# Patient Record
Sex: FEMALE | Race: OTHER
[De-identification: names, ages, dates, MRNs, and addresses within clinical notes are randomized per-mention and may not be internally consistent; named-entity substitution may affect disease eponyms.]

---

## 2017-11-20 ENCOUNTER — HOSPITAL ENCOUNTER (INPATIENT)
Dept: HOSPITAL 74 - JER | Age: 42
LOS: 2 days | Discharge: HOME | DRG: 770 | End: 2017-11-22
Attending: OBSTETRICS & GYNECOLOGY | Admitting: OBSTETRICS & GYNECOLOGY
Payer: COMMERCIAL

## 2017-11-20 VITALS — BODY MASS INDEX: 20.1 KG/M2

## 2017-11-20 DIAGNOSIS — Z3A.09: ICD-10-CM

## 2017-11-20 DIAGNOSIS — A41.9: ICD-10-CM

## 2017-11-20 DIAGNOSIS — O03.37: Primary | ICD-10-CM

## 2017-11-20 LAB
ALBUMIN SERPL-MCNC: 3.9 G/DL (ref 3.4–5)
ALP SERPL-CCNC: 47 U/L (ref 45–117)
ALT SERPL-CCNC: 20 U/L (ref 12–78)
ANION GAP SERPL CALC-SCNC: 10 MMOL/L (ref 8–16)
APTT BLD: 29.6 SECONDS (ref 26.9–34.4)
AST SERPL-CCNC: 17 U/L (ref 15–37)
BILIRUB SERPL-MCNC: 0.7 MG/DL (ref 0.2–1)
CALCIUM SERPL-MCNC: 8.7 MG/DL (ref 8.5–10.1)
CO2 SERPL-SCNC: 25 MMOL/L (ref 21–32)
CREAT SERPL-MCNC: 0.8 MG/DL (ref 0.55–1.02)
DEPRECATED RDW RBC AUTO: 13.9 % (ref 11.6–15.6)
GLUCOSE SERPL-MCNC: 106 MG/DL (ref 74–106)
INR BLD: 1.09 (ref 0.82–1.09)
MCH RBC QN AUTO: 29.2 PG (ref 25.7–33.7)
MCHC RBC AUTO-ENTMCNC: 32 G/DL (ref 32–36)
MCV RBC: 91.1 FL (ref 80–96)
METAMYELOCYTES NFR BLD: 2 % (ref 0–2)
NEUTS BAND NFR BLD: 8 %
PLATELET # BLD AUTO: 238 K/MM3 (ref 134–434)
PLATELET BLD QL SMEAR: ADEQUATE
PMV BLD: 7.9 FL (ref 7.5–11.1)
PROT SERPL-MCNC: 7.1 G/DL (ref 6.4–8.2)
PT PNL PPP: 12.3 SEC (ref 9.98–11.88)
TOTAL CELLS COUNTED BLD: 100
TOXIC GRANULES BLD QL SMEAR: (no result)
WBC # BLD AUTO: 17.8 K/MM3 (ref 4–10)

## 2017-11-20 PROCEDURE — 10D17ZZ EXTRACTION OF PRODUCTS OF CONCEPTION, RETAINED, VIA NATURAL OR ARTIFICIAL OPENING: ICD-10-PCS | Performed by: OBSTETRICS & GYNECOLOGY

## 2017-11-20 NOTE — PDOC
Rapid Medical Evaluation


Chief Complaint: Vaginal Bleeding


Time Seen by Provider: 11/20/17 13:56


Medical Evaluation: 


 Allergies











Allergy/AdvReac Type Severity Reaction Status Date / Time


 


No Known Allergies Allergy   Verified 11/20/17 13:45








 Vital Signs











Temp Pulse Resp BP Pulse Ox


 


 98.5 F   104 H  18   117/71   100 


 


 11/20/17 13:45  11/20/17 13:45  11/20/17 13:45  11/20/17 13:45  11/20/17 13:45











11/20/17 14:07


I have performed a brief in-person evaluation of this patient.





The patient presents with a chief complaint of: diarrhea, blood in stool





Pertinent physical exam findings: stable





I have ordered the following: cbc, cmp, coags, type and screen





The patient will proceed to the ED for further evaluation.


11/20/17 14:35

## 2017-11-20 NOTE — OP
Operative Note





- Note:


Operative Date: 17


Pre-Operative Diagnosis: 41yo with Incomplete, infected 


Operation: Dialation and curettage


Findings: 





Retained POC


Post-Operative Diagnosis: Same as Pre-op


Surgeon: Elana Campos


Anesthesiologist/CRNA: Garret Way


Anesthesia: MAC


Estimated Blood Loss (mls): 0


Drains, Volume Out (mls): 50


Fluid Volume Replaced (mls): 500


Operative Report Dictated: Yes

## 2017-11-20 NOTE — PDOC
History of Present Illness





- General


Chief Complaint: Vaginal Bleeding


Stated Complaint: O.R PCP SENT FOR ADMIN d&c


Time Seen by Provider: 11/20/17 13:56





- History of Present Illness


Initial Comments: 





11/20/17 15:47


Ms. Eagle is a 43 yo female w/ pmh of prior left leg blood clot during 

pregnancy and eczema who presents from provider. She says that she had a 

miscarriage on October 25th and started bleeding Nov. 1st. She has been 

bleeding more or less since. She presented to her provider today for evaluation 

and was sent here with D&C scheduled for 7pm. 





The patient denies chest pain, shortness of breath, headache and dizziness. 

Denies fever, chills, nausea, vomit, diarrhea and constipation. Denies dysuria, 

frequency, urgency and hematuria.





Allergies: NKDA





Past History





- Past Medical History


Allergies/Adverse Reactions: 


 Allergies











Allergy/AdvReac Type Severity Reaction Status Date / Time


 


No Known Allergies Allergy   Verified 11/20/17 13:45











COPD: No





- Suicide/Smoking/Psychosocial Hx


Smoking History: Never smoked


Information on smoking cessation initiated: No


Hx Alcohol Use: No


Drug/Substance Use Hx: No


Substance Use Type: None





**Review of Systems





- Review of Systems


Comments:: 





11/20/17 15:51


GENERAL/CONSTITUTIONAL: +Generalized weakness


HEAD, EYES, EARS, NOSE AND THROAT: No change in vision. No ear pain or 

discharge. No sore throat.


CARDIOVASCULAR: No chest pain or shortness of breath


RESPIRATORY: No cough, wheezing, or hemoptysis.


GASTROINTESTINAL: No nausea, vomiting, diarrhea or constipation.


GENITOURINARY: No dysuria, frequency, or change in urination.


MUSCULOSKELETAL: No joint or muscle swelling or pain. No neck or back pain.


SKIN: No rash


NEUROLOGIC: No headache, vertigo, loss of consciousness, or change in strength/

sensation.


ENDOCRINE: No increased thirst. No abnormal weight change


HEMATOLOGIC/LYMPHATIC: No anemia, easy bleeding, or history of blood clots.


ALLERGIC/IMMUNOLOGIC: No hives or skin allergy.


: Vaginal bleeding reported 





*Physical Exam





- Vital Signs


 Last Vital Signs











Temp Pulse Resp BP Pulse Ox


 


 98.5 F   104 H  18   117/71   100 


 


 11/20/17 13:45  11/20/17 13:45  11/20/17 13:45  11/20/17 13:45  11/20/17 13:45














- Physical Exam


Comments: 





11/20/17 15:52


GENERAL: Awake, alert, and fully oriented, in no acute distress


HEAD: No signs of trauma, normocephalic, atraumatic


EYES: PERRLA, EOMI, sclera anicteric, conjunctiva clear


ENT: Auricles normal inspection, hearing grossly normal, nares patent, 

oropharynx clear without


exudates. Moist mucosa


NECK: Normal ROM, supple, no lymphadenopathy, JVD, or masses


LUNGS: No distress, speaks full sentences, clear to auscultation bilaterally


HEART: Regular rate and rhythm, normal S1 and S2, no murmurs, rubs or gallops, 

peripheral pulses normal and equal bilaterally.


ABDOMEN: Soft, nontender, normoactive bowel sounds.  No guarding, no rebound.  

No masses


EXTREMITIES: Normal inspection, Normal range of motion, no edema.  No clubbing 

or cyanosis.


NEUROLOGICAL: Cranial nerves II through XII grossly intact.  Normal speech, 

normal gait, no focal sensorimotor deficits


SKIN: Warm, Dry, normal turgor, no rashes or lesions noted.





ED Treatment Course





- LABORATORY


CBC & Chemistry Diagram: 


 11/20/17 14:04





 11/20/17 14:04





- ADDITIONAL ORDERS


Additional order review: 


 Laboratory  Results











  11/20/17 11/20/17 11/20/17





  14:04 14:04 14:04


 


PT with INR    12.30 H


 


INR    1.09


 


PTT (Actin FS)    29.6


 


Sodium  140  


 


Potassium  3.8  


 


Chloride  105  


 


Carbon Dioxide  25  


 


Anion Gap  10  


 


BUN  10  


 


Creatinine  0.8  


 


Creat Clearance w eGFR  > 60  


 


Random Glucose  106  


 


Calcium  8.7  


 


Total Bilirubin  0.7  


 


AST  17  


 


ALT  20  


 


Alkaline Phosphatase  47  


 


Total Protein  7.1  


 


Albumin  3.9  


 


Serum Pregnancy, Qual   Positive 








 











  11/20/17





  14:04


 


RBC  4.05


 


MCV  91.1


 


MCHC  32.0


 


RDW  13.9


 


MPV  7.9


 


Neutrophils %  No Result Required.


 


Lymphocytes %  No Result Required.














Medical Decision Making





- Medical Decision Making





11/20/17 15:52


Patient discussed with admitting  who will perform D&C tonight. Vaginal exam 

not done as not diagnostically necessary. Patient will be admitted for planned 

surgery and IV ABX started.





*DC/Admit/Observation/Transfer


Diagnosis at time of Disposition: 


 Vaginal bleeding








- Discharge Dispostion


Admit: Yes





- Referrals





- Patient Instructions





- Post Discharge Activity

## 2017-11-20 NOTE — HP
Admitting History and Physical





- Primary Care Physician


PCP: Elana Campos





- Admission


Chief Complaint: 41yo P2 with abdominal pain, foul smeling discharge, leveling 

HCG, post spontaneous Ab


History of Present Illness: 





seen 10/25/17 9.5wks by dates, HCG 19,531, empty sack on US


Since all signs of SAB, dropping HCG count 13,995(10/28), 8001 (11/2) she was 

observed


She complained on 11/11 of foul odor and was given and took course of PO Flagyl 

without resolution of symptoms, HCG 84 (11/18)


presented to the office today 11/20 with worsening of pain and foul smelling 

discharge


History Source: Patient


Limitations to Obtaining History: No Limitations





- Past Medical History


Cardiovascular: Yes: Deep Vein Thrombosis (2009 during first pregnancy, states 

had negative w/up)


Reproductive: Yes: Other (Ovarian cyst)


...LMP: 08/16/17


...Pregnant: Yes (s/p Incomplete Ab)





- Past Surgical History


Past Surgical History: Yes: Breast Biopsy


Additional Past Surgical History: 





nevus removal





- Smoking History


Smoking history: Never smoked


Have you smoked in the past 12 months: No





- Alcohol/Substance Use


Hx Alcohol Use: No





Home Medications





- Allergies


Allergies/Adverse Reactions: 


 Allergies











Allergy/AdvReac Type Severity Reaction Status Date / Time


 


No Known Allergies Allergy   Verified 11/20/17 13:45














- Home Medications


Home Medications: 


Ambulatory Orders





NK [No Known Home Medication]  11/20/17 











Family Disease History





- Family Disease History


Family Disease History: Other: Mother (DVT, PE)





Review of Systems





- Review of Systems


Constitutional: reports: No Symptoms


Eyes: reports: No Symptoms


HENT: reports: No Symptoms


Neck: reports: No Symptoms


Cardiovascular: reports: No Symptoms


Respiratory: reports: No Symptoms


Gastrointestinal: reports: Abdominal Pain


Genitourinary: reports: Vaginal Bleeding, Other (foul copious vaginal discharge

, fundal tenderness)





Physical Examination


Vital Signs: 


 Vital Signs











Temperature  98.2 F   11/20/17 21:58


 


Pulse Rate  66   11/20/17 21:58


 


Respiratory Rate  17   11/20/17 21:58


 


Blood Pressure  118/82   11/20/17 21:58


 


O2 Sat by Pulse Oximetry (%)  100   11/20/17 21:58











Constitutional: Yes: Well Nourished


HENT: Yes: WNL


Neck: Yes: WNL


Cardiovascular: Yes: WNL


Respiratory: Yes: WNL


Gastrointestinal: Yes: WNL, Tenderness, Other (low abdominal tenderness)


Renal/: Yes: Vaginal Discharge (purulent, foul odor discharge), Other (CMT, 

Fundal tenderness)


Musculoskeletal: Yes: WNL


Extremities: Yes: WNL


Integumentary: Yes: WNL


Neurological: Yes: WNL


...Motor Strength: WNL


Psychiatric: Yes: WNL


Labs: 


 CBC, BMP





 11/20/17 14:04 





 11/20/17 14:04 











Imaging





- Results


Ultrasound: Image Reviewed (1.2cm endometrial stripe, irregula c/w retained 

products)





Assessment/Plan





43 yo P2 with Incomplete Septic Ab and Endometritis


Admit for D&C


NPO, IVF, Start triple antibiotics: Ceftriaxone, Flagyl, Doxycycline


Consent signed, patient understands risks of infection bleeding, uterine 

perforation and scaring

## 2017-11-20 NOTE — PDOC
Attending Attestation





- Resident


Resident Name: Marcel Joyner





- ED Attending Attestation


I have performed the following: I have examined & evaluated the patient, The 

case was reviewed & discussed with the resident, I agree w/resident's findings 

& plan, Exceptions are as noted





- Medical Decision Making





11/20/17 16:35








I, Dr. Mary Ruelas, DO, attest that this document has been prepared under 

my direction and personally reviewed by me in its entirety.   I further attest, 

that it accurately reflects all work, treatment, procedures and medical decision

-making performed by me.  





a/p: 41yo female sent by OB for preop labs for d/c tonight


-preop labs, hcg, ivf hydration, zofran, pepcid, NPO.


will be going to the OR with gyn at 7pm





<Mary Ruelas - Last Filed: 11/20/17 16:34>





- HPI


HPI: 





11/20/17 16:39


Patient is a 42 year old female with PMHx of left leg blood clot during 

pregnancy who presents to the ER for vaginal bleeding. She was sent to the ER 

and has a D&C scheduled for 7pm tonight. She has been NPO since last night.  

She complains of nausea and vomiting since 11am this morning. She is having 

lower abdominal pelvic cramps. 





- Physicial Exam


PE: 





11/20/17 16:38





GENERAL: Awake, alert, and fully oriented, in no acute distress


HEAD: No signs of trauma


EYES: PERRLA, EOMI, sclera anicteric, conjunctiva clear


ENT: Auricles normal inspection, hearing grossly normal, nares patent, 

oropharynx clear without exudates. Moist mucosa


NECK: Normal ROM, supple, no lymphadenopathy, JVD, or masses


LUNGS: Breath sounds equal, clear to auscultation bilaterally.  No wheezes, and 

no crackles


HEART: Regular rate and rhythm, normal S1 and S2, no murmurs, rubs or gallops


ABDOMEN: Mild tenderness in left pelvic area. Residual heartburn from vomiting 

today. Soft,, normoactive bowel sounds.  No guarding, no rebound.  No masses


EXTREMITIES: No lower extremity edema. Normal range of motion, no edema.  No 

clubbing or cyanosis. No cords, erythema, or tenderness


NEUROLOGICAL: Cranial nerves II through XII grossly intact.  Normal speech, 

normal gait


SKIN: Warm, Dry, normal turgor, no rashes or lesions noted.








<Maria T Mendez - Last Filed: 11/20/17 16:40>

## 2017-11-21 LAB
DEPRECATED RDW RBC AUTO: 14 % (ref 11.6–15.6)
MCH RBC QN AUTO: 29.3 PG (ref 25.7–33.7)
MCHC RBC AUTO-ENTMCNC: 32.4 G/DL (ref 32–36)
MCV RBC: 90.6 FL (ref 80–96)
PLATELET # BLD AUTO: 228 K/MM3 (ref 134–434)
PMV BLD: 8.3 FL (ref 7.5–11.1)
WBC # BLD AUTO: 14.5 K/MM3 (ref 4–10)

## 2017-11-21 RX ADMIN — METRONIDAZOLE SCH MLS/HR: 500 INJECTION, SOLUTION INTRAVENOUS at 14:49

## 2017-11-21 RX ADMIN — CEFTRIAXONE SCH MLS/HR: 1 INJECTION, SOLUTION INTRAVENOUS at 09:17

## 2017-11-21 RX ADMIN — METRONIDAZOLE SCH MLS/HR: 500 INJECTION, SOLUTION INTRAVENOUS at 08:11

## 2017-11-21 RX ADMIN — METRONIDAZOLE SCH MLS/HR: 500 INJECTION, SOLUTION INTRAVENOUS at 04:15

## 2017-11-21 RX ADMIN — ENOXAPARIN SODIUM SCH MG: 30 INJECTION SUBCUTANEOUS at 00:17

## 2017-11-21 RX ADMIN — CEFTRIAXONE SCH MLS/HR: 1 INJECTION, SOLUTION INTRAVENOUS at 01:14

## 2017-11-21 RX ADMIN — METRONIDAZOLE SCH MLS/HR: 500 INJECTION, SOLUTION INTRAVENOUS at 20:51

## 2017-11-21 RX ADMIN — METRONIDAZOLE SCH MLS/HR: 500 INJECTION, SOLUTION INTRAVENOUS at 00:09

## 2017-11-21 RX ADMIN — DOXYCYCLINE SCH MLS/HR: 100 INJECTION, POWDER, LYOPHILIZED, FOR SOLUTION INTRAVENOUS at 22:06

## 2017-11-21 RX ADMIN — DOXYCYCLINE SCH MLS/HR: 100 INJECTION, POWDER, LYOPHILIZED, FOR SOLUTION INTRAVENOUS at 02:08

## 2017-11-21 RX ADMIN — DOXYCYCLINE SCH MLS/HR: 100 INJECTION, POWDER, LYOPHILIZED, FOR SOLUTION INTRAVENOUS at 10:04

## 2017-11-21 RX ADMIN — ENOXAPARIN SODIUM SCH MG: 30 INJECTION SUBCUTANEOUS at 09:17

## 2017-11-21 RX ADMIN — ENOXAPARIN SODIUM SCH MG: 30 INJECTION SUBCUTANEOUS at 22:08

## 2017-11-21 NOTE — PN
Progress Note, Physician


Chief Complaint: 





Feels better, no nausea, no vomiting, pain controlled


History of Present Illness: 





s/p D&C 11/21/17





- Current Medication List


Current Medications: 


Active Medications





Enoxaparin Sodium (Lovenox -)  30 mg SQ BID [facility]


   Last Admin: 11/21/17 00:17 Dose:  30 mg


Lactated Ringer's (Lactated Ringers Solution)  1,000 mls @ 75 mls/hr IV ASDIR 

FLORI


Parenteral Electrolytes (Plasma-Lyte 148 -)  1,000 mls @ 125 mls/hr IV ASDIR [facility]


   Last Admin: 11/20/17 23:15 Dose:  0 mls


Metronidazole (Flagyl 500mg Premixed Ivpb -)  500 mg in 100 mls @ 100 mls/hr 

IVPB Q6H-IV [facility]


   Stop: 11/21/17 23:14


   Last Admin: 11/21/17 08:11 Dose:  100 mls/hr


Doxycycline Hyclate 100 mg/ (Dextrose)  100 mls @ 50 mls/hr IVPB BID [facility]


   Stop: 11/21/17 23:14


   Last Admin: 11/21/17 02:08 Dose:  50 mls/hr


CEFTRIAXONE 1 G/50 ML PREMIX (Ceftriaxone 1 Gm-D5w Bag)  50 mls @ 100 mls/hr 

IVPB DAILY [facility]


   Stop: 11/21/17 23:14


   Last Admin: 11/21/17 01:14 Dose:  100 mls/hr


Ibuprofen (Motrin -)  600 mg PO Q6H PRN


   PRN Reason: FEVER


Ibuprofen (Caldolor Injection -)  800 mg IVPB Q6H PRN


   PRN Reason: FEVER


Ondansetron HCl (Zofran Injection)  4 mg IVPUSH Q6H PRN


   PRN Reason: NAUSEA AND/OR VOMITING


Ondansetron HCl (Zofran Injection)  4 mg IVPUSH Q6H PRN


   PRN Reason: NAUSEA


Oxycodone HCl (Roxicodone -)  10 mg PO Q4H PRN


   PRN Reason: SEVERE PAIN


   Stop: 11/21/17 22:54


Promethazine HCl (Phenergan Injection -)  12.5 mg IVPUSH Q6H PRN


   PRN Reason: NAUSEA-FOR RESCUE AFTER 15 MIN











- Objective


Vital Signs: 


 Vital Signs











Temperature  98 F   11/21/17 08:05


 


Pulse Rate  58 L  11/21/17 08:05


 


Respiratory Rate  20   11/21/17 08:05


 


Blood Pressure  94/40   11/21/17 08:05


 


O2 Sat by Pulse Oximetry (%)  98   11/20/17 23:05











Constitutional: Yes: Well Nourished


Neck: Yes: WNL


Cardiovascular: Yes: WNL


Respiratory: Yes: WNL


Gastrointestinal: Yes: WNL, Soft, Other (mild LLQ tenderness)


Genitourinary: Yes: Vaginal Bleeding (minimal)


Extremities: Yes: WNL (Venodyne stockings)


Neurological: Yes: WNL


...Motor Strength: WNL


Psychiatric: Yes: WNL


Labs: 


 CBC, BMP





 11/20/17 14:04 





 INR, PTT











INR  1.09  (0.82-1.09)   11/20/17  14:04    














Assessment/Plan





41 yo P2 s/p D&C for Septic AB doing well 


WBC improving


still some mild abdominal symptoms


will observe for 24 more hr of IV Abx


than will d/c home on PO

## 2017-11-22 VITALS — HEART RATE: 55 BPM | TEMPERATURE: 98.2 F | DIASTOLIC BLOOD PRESSURE: 62 MMHG | SYSTOLIC BLOOD PRESSURE: 99 MMHG

## 2017-11-22 LAB
BASOPHILS # BLD: 0.4 % (ref 0–2)
DEPRECATED RDW RBC AUTO: 14.1 % (ref 11.6–15.6)
EOSINOPHIL # BLD: 1.1 % (ref 0–4.5)
MCH RBC QN AUTO: 29.7 PG (ref 25.7–33.7)
MCHC RBC AUTO-ENTMCNC: 32.1 G/DL (ref 32–36)
MCV RBC: 92.3 FL (ref 80–96)
NEUTROPHILS # BLD: 57.7 % (ref 42.8–82.8)
PLATELET # BLD AUTO: 197 K/MM3 (ref 134–434)
PMV BLD: 8.2 FL (ref 7.5–11.1)
WBC # BLD AUTO: 8.3 K/MM3 (ref 4–10)

## 2017-11-22 RX ADMIN — METRONIDAZOLE SCH MLS/HR: 500 INJECTION, SOLUTION INTRAVENOUS at 02:46

## 2017-11-22 RX ADMIN — METRONIDAZOLE SCH MLS/HR: 500 INJECTION, SOLUTION INTRAVENOUS at 08:52

## 2017-11-22 RX ADMIN — ENOXAPARIN SODIUM SCH MG: 30 INJECTION SUBCUTANEOUS at 09:51

## 2017-11-22 RX ADMIN — METRONIDAZOLE SCH MLS/HR: 500 INJECTION, SOLUTION INTRAVENOUS at 15:03

## 2017-11-22 NOTE — DS
Physical Examination


Vital Signs: 


 Vital Signs











Temperature  98.2 F   11/22/17 09:16


 


Pulse Rate  55 L  11/22/17 09:16


 


Respiratory Rate  20   11/22/17 09:16


 


Blood Pressure  99/62   11/22/17 09:16


 


O2 Sat by Pulse Oximetry (%)  98   11/20/17 23:05











Labs: 


 CBC, BMP





 11/22/17 07:45 





 11/20/17 14:04 











Discharge Summary


Reason For Visit: VAGINAL BLEEDING


Current Active Problems





Vaginal bleeding (Acute)











- Instructions





- Home Medications


Comprehensive Discharge Medication List: 


Ambulatory Orders





NK [No Known Home Medication]  11/20/17

## 2017-11-22 NOTE — PATH
Surgical Pathology Report



Patient Name:  JAMILA PRADO

Accession #:  [id number]

[facility]. Rec. #:  [id number]                                                        

   /Age/Gender:  1975 (Age: 42) / F

Account:  [id number]                                                          

             Location: [facility] OBS/GYN

Taken:  2017

Received:  2017

Reported:  2017

Physicians:  Elana Campos M.D.

  



Specimen(s) Received

 PRODUCTS OF CONCEPTION AND CURETTAGE 





Clinical History

Preoperative diagnosis: Incomplete 

Postoperative diagnosis: Same







Final Diagnosis

UTERINE CONTENTS, CURETTINGS:

DEGENERATED CHORIONIC VILLI CONSISTENT WITH PRODUCTS OF CONCEPTION, ALONG WITH

DECIDUA AND WEAKLY PROLIFERATIVE ENDOMETRIUM.





***Electronically Signed***

Ted Pedroza M.D.





Gross Description

Received in formalin labeled "products of conception and curettage," is a 5.5 x

4.0 x 0.4 cm aggregate of tan brown soft tissue fragments. No definite villous

tissue or fetal somatic tissue is identified. Representative sections are

submitted in 3 cassettes.

/2017



[facility]

## 2017-11-22 NOTE — OP
DATE OF OPERATION:  2017

 

PREOPERATIVE DIAGNOSIS:  A 22-year-old with incomplete effective .

 

OPERATION:  Dilation and curettage.

 

FINDINGS:  Retained product of conception. 

 

POSTOPERATIVE DIAGNOSIS:   Retained product of conception.

 

SURGEON:  Elana Campos MD

 

ANESTHESIOLOGIST:  Garret Way MD

 

ANESTHESIA:  MAC.

 

ESTIMATED BLOOD LOSS:  Zero mL. 

 

URINE OUTPUT:  50 mL.  Patient received 500 mL of IV fluids. 

 

DESCRIPTION OF PROCEDURE:  After assuring informed consent, the patient was brought

to the operating room where she was placed in the dorsal lithotomy position. 

Perineum and vagina were prepped with betadine and draped in the sterile fashion.  A

blunt-sided speculum was placed in the vagina and the vagina was previously examined

and the uterus was found to be 7 cm in size with closed cervix.  The cervix was

articulated with a single-toothed tenaculum and gradually dilated per dilators to

accommodate a 7-gauge curette.  Suction curettage was performed and products of

conception were suctioned and visualized.  Aspirated gentle curettage with number 2

curette was performed in all 4 quadrants.  Uterus was found to be empty.  Suction was

performed one more time.  All instruments were removed from the cervix and vagina. 

Sponge and instrument count was correct x2.  Patient was brought to the recovery room

in stable condition.

 

 

CONOR [id number]

DD: 2017 20:39

DT: 2017 00:31

Job #:  [id number]

## 2022-03-18 ENCOUNTER — HOSPITAL ENCOUNTER (EMERGENCY)
Dept: HOSPITAL 74 - JER | Age: 47
Discharge: HOME | End: 2022-03-18
Payer: COMMERCIAL

## 2022-03-18 VITALS — BODY MASS INDEX: 20.1 KG/M2

## 2022-03-18 VITALS — HEART RATE: 70 BPM | SYSTOLIC BLOOD PRESSURE: 151 MMHG | TEMPERATURE: 97.8 F | DIASTOLIC BLOOD PRESSURE: 84 MMHG
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
## 2022-03-18 DIAGNOSIS — S03.00XA: Primary | ICD-10-CM

## 2022-12-27 ENCOUNTER — HOSPITAL ENCOUNTER (OUTPATIENT)
Dept: HOSPITAL 74 - JASU-SURG | Age: 47
Discharge: HOME | End: 2022-12-27
Attending: OBSTETRICS & GYNECOLOGY
Payer: COMMERCIAL

## 2022-12-27 VITALS — RESPIRATION RATE: 16 BRPM

## 2022-12-27 VITALS — BODY MASS INDEX: 20.9 KG/M2

## 2022-12-27 VITALS — HEART RATE: 55 BPM | SYSTOLIC BLOOD PRESSURE: 117 MMHG | DIASTOLIC BLOOD PRESSURE: 57 MMHG

## 2022-12-27 VITALS — TEMPERATURE: 97.3 F

## 2022-12-27 DIAGNOSIS — D25.9: ICD-10-CM

## 2022-12-27 DIAGNOSIS — N92.0: Primary | ICD-10-CM

## 2022-12-27 DIAGNOSIS — N84.0: ICD-10-CM

## 2022-12-27 PROCEDURE — 0UDB7ZX EXTRACTION OF ENDOMETRIUM, VIA NATURAL OR ARTIFICIAL OPENING, DIAGNOSTIC: ICD-10-PCS | Performed by: OBSTETRICS & GYNECOLOGY

## 2022-12-27 PROCEDURE — 0UB98ZX EXCISION OF UTERUS, VIA NATURAL OR ARTIFICIAL OPENING ENDOSCOPIC, DIAGNOSTIC: ICD-10-PCS | Performed by: OBSTETRICS & GYNECOLOGY
